# Patient Record
Sex: FEMALE | Race: BLACK OR AFRICAN AMERICAN | NOT HISPANIC OR LATINO | Employment: FULL TIME | URBAN - METROPOLITAN AREA
[De-identification: names, ages, dates, MRNs, and addresses within clinical notes are randomized per-mention and may not be internally consistent; named-entity substitution may affect disease eponyms.]

---

## 2019-09-22 PROBLEM — J20.9 ACUTE BRONCHITIS: Status: ACTIVE | Noted: 2019-09-22

## 2019-09-22 PROBLEM — J01.90 ACUTE BACTERIAL SINUSITIS: Status: ACTIVE | Noted: 2019-09-22

## 2019-09-22 PROBLEM — B96.89 ACUTE BACTERIAL SINUSITIS: Status: ACTIVE | Noted: 2019-09-22

## 2019-12-23 PROBLEM — J01.90 ACUTE BACTERIAL SINUSITIS: Status: RESOLVED | Noted: 2019-09-22 | Resolved: 2019-12-23

## 2019-12-23 PROBLEM — B96.89 ACUTE BACTERIAL SINUSITIS: Status: RESOLVED | Noted: 2019-09-22 | Resolved: 2019-12-23

## 2020-05-20 PROBLEM — S93.401A SPRAIN OF RIGHT ANKLE: Status: ACTIVE | Noted: 2020-05-20
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
## 2020-11-01 PROBLEM — S80.812A ABRASION OF ANTERIOR LEFT LOWER LEG: Status: ACTIVE | Noted: 2020-11-01

## 2020-11-01 PROBLEM — S46.912A LEFT SHOULDER STRAIN, INITIAL ENCOUNTER: Status: ACTIVE | Noted: 2020-11-01

## 2020-11-01 PROBLEM — S50.02XA CONTUSION OF LEFT ELBOW: Status: ACTIVE | Noted: 2020-11-01

## 2020-11-01 PROBLEM — S70.02XA CONTUSION OF LEFT HIP: Status: ACTIVE | Noted: 2020-11-01

## 2020-11-01 PROBLEM — S80.02XA CONTUSION OF LEFT KNEE: Status: ACTIVE | Noted: 2020-11-01

## 2020-11-28 PROBLEM — J45.909 ACUTE BRONCHITIS WITH ASTHMA: Status: ACTIVE | Noted: 2019-09-22

## 2020-11-28 PROBLEM — R07.9 CHEST PAIN: Status: ACTIVE | Noted: 2020-11-28

## 2020-11-28 PROBLEM — J02.9 ACUTE PHARYNGITIS: Status: ACTIVE | Noted: 2020-11-28

## 2020-11-28 PROBLEM — J01.00 ACUTE NON-RECURRENT MAXILLARY SINUSITIS: Status: ACTIVE | Noted: 2020-11-28

## 2020-11-28 PROBLEM — R05.9 COUGH: Status: ACTIVE | Noted: 2020-11-28

## 2021-02-07 ENCOUNTER — HOSPITAL ENCOUNTER (EMERGENCY)
Facility: HOSPITAL | Age: 41
Discharge: HOME | End: 2021-02-07
Attending: EMERGENCY MEDICINE
Payer: MEDICAID

## 2021-02-07 VITALS
DIASTOLIC BLOOD PRESSURE: 71 MMHG | HEART RATE: 81 BPM | SYSTOLIC BLOOD PRESSURE: 119 MMHG | OXYGEN SATURATION: 99 % | RESPIRATION RATE: 18 BRPM | TEMPERATURE: 98 F

## 2021-02-07 DIAGNOSIS — T14.90XA TRAUMA: ICD-10-CM

## 2021-02-07 DIAGNOSIS — S70.11XA CONTUSION OF RIGHT THIGH, INITIAL ENCOUNTER: ICD-10-CM

## 2021-02-07 DIAGNOSIS — S90.01XA CONTUSION OF RIGHT ANKLE, INITIAL ENCOUNTER: ICD-10-CM

## 2021-02-07 DIAGNOSIS — S70.01XA CONTUSION OF RIGHT HIP, INITIAL ENCOUNTER: Primary | ICD-10-CM

## 2021-02-07 PROCEDURE — 99283 EMERGENCY DEPT VISIT LOW MDM: CPT | Mod: 25

## 2021-02-07 PROCEDURE — 25000003 PHARM REV CODE 250: Performed by: EMERGENCY MEDICINE

## 2021-02-07 RX ORDER — PROMETHAZINE HYDROCHLORIDE 12.5 MG/1
12.5 TABLET ORAL EVERY 6 HOURS PRN
Qty: 10 TABLET | Refills: 0 | Status: SHIPPED | OUTPATIENT
Start: 2021-02-07

## 2021-02-07 RX ORDER — OXYCODONE AND ACETAMINOPHEN 5; 325 MG/1; MG/1
1 TABLET ORAL
Status: COMPLETED | OUTPATIENT
Start: 2021-02-07 | End: 2021-02-07

## 2021-02-07 RX ADMIN — OXYCODONE HYDROCHLORIDE AND ACETAMINOPHEN 1 TABLET: 5; 325 TABLET ORAL at 09:02

## 2021-03-01 PROBLEM — J01.00 ACUTE NON-RECURRENT MAXILLARY SINUSITIS: Status: RESOLVED | Noted: 2020-11-28 | Resolved: 2021-03-01

## 2021-05-06 ENCOUNTER — PATIENT MESSAGE (OUTPATIENT)
Dept: RESEARCH | Facility: HOSPITAL | Age: 41
End: 2021-05-06

## 2021-07-08 ENCOUNTER — HOSPITAL ENCOUNTER (EMERGENCY)
Facility: HOSPITAL | Age: 41
Discharge: HOME | End: 2021-07-08
Attending: FAMILY MEDICINE
Payer: MEDICAID

## 2021-07-08 VITALS
BODY MASS INDEX: 37.86 KG/M2 | SYSTOLIC BLOOD PRESSURE: 136 MMHG | RESPIRATION RATE: 16 BRPM | DIASTOLIC BLOOD PRESSURE: 94 MMHG | OXYGEN SATURATION: 100 % | HEART RATE: 73 BPM | TEMPERATURE: 99 F | WEIGHT: 256.38 LBS

## 2021-07-08 DIAGNOSIS — J06.9 VIRAL URI: Primary | ICD-10-CM

## 2021-07-08 LAB
GROUP A STREP, MOLECULAR: NEGATIVE
SARS-COV-2 RDRP RESP QL NAA+PROBE: NEGATIVE

## 2021-07-08 PROCEDURE — 87651 STREP A DNA AMP PROBE: CPT | Performed by: NURSE PRACTITIONER

## 2021-07-08 PROCEDURE — 96372 THER/PROPH/DIAG INJ SC/IM: CPT

## 2021-07-08 PROCEDURE — 99284 EMERGENCY DEPT VISIT MOD MDM: CPT | Mod: 25

## 2021-07-08 PROCEDURE — 63600175 PHARM REV CODE 636 W HCPCS: Performed by: NURSE PRACTITIONER

## 2021-07-08 PROCEDURE — U0002 COVID-19 LAB TEST NON-CDC: HCPCS | Performed by: NURSE PRACTITIONER

## 2021-07-08 RX ORDER — METHYLPREDNISOLONE SOD SUCC 125 MG
125 VIAL (EA) INJECTION
Status: COMPLETED | OUTPATIENT
Start: 2021-07-08 | End: 2021-07-08

## 2021-07-08 RX ORDER — FLUTICASONE PROPIONATE 50 MCG
2 SPRAY, SUSPENSION (ML) NASAL DAILY
Qty: 9.9 ML | Refills: 0 | Status: SHIPPED | OUTPATIENT
Start: 2021-07-08

## 2021-07-08 RX ORDER — PROMETHAZINE HYDROCHLORIDE AND DEXTROMETHORPHAN HYDROBROMIDE 6.25; 15 MG/5ML; MG/5ML
5 SYRUP ORAL EVERY 6 HOURS PRN
Qty: 120 ML | Refills: 0 | Status: SHIPPED | OUTPATIENT
Start: 2021-07-08 | End: 2021-07-18

## 2021-07-08 RX ADMIN — METHYLPREDNISOLONE SODIUM SUCCINATE 125 MG: 125 INJECTION, POWDER, FOR SOLUTION INTRAMUSCULAR; INTRAVENOUS at 07:07

## 2022-04-22 ENCOUNTER — HOSPITAL ENCOUNTER (EMERGENCY)
Facility: HOSPITAL | Age: 42
Discharge: HOME | End: 2022-04-23
Attending: STUDENT IN AN ORGANIZED HEALTH CARE EDUCATION/TRAINING PROGRAM
Payer: MEDICAID

## 2022-04-22 DIAGNOSIS — K08.89 PAIN, DENTAL: Primary | ICD-10-CM

## 2022-04-22 PROCEDURE — 99283 EMERGENCY DEPT VISIT LOW MDM: CPT | Mod: 25

## 2022-04-23 VITALS
OXYGEN SATURATION: 97 % | WEIGHT: 224.88 LBS | RESPIRATION RATE: 18 BRPM | HEART RATE: 72 BPM | BODY MASS INDEX: 33.21 KG/M2 | SYSTOLIC BLOOD PRESSURE: 120 MMHG | TEMPERATURE: 96 F | DIASTOLIC BLOOD PRESSURE: 80 MMHG

## 2022-04-23 PROCEDURE — 25000003 PHARM REV CODE 250: Performed by: STUDENT IN AN ORGANIZED HEALTH CARE EDUCATION/TRAINING PROGRAM

## 2022-04-23 RX ORDER — AMOXICILLIN AND CLAVULANATE POTASSIUM 875; 125 MG/1; MG/1
1 TABLET, FILM COATED ORAL 2 TIMES DAILY
Qty: 14 TABLET | Refills: 0 | OUTPATIENT
Start: 2022-04-23 | End: 2023-05-25

## 2022-04-23 RX ORDER — AMOXICILLIN AND CLAVULANATE POTASSIUM 875; 125 MG/1; MG/1
1 TABLET, FILM COATED ORAL
Status: COMPLETED | OUTPATIENT
Start: 2022-04-23 | End: 2022-04-23

## 2022-04-23 RX ORDER — OXYCODONE AND ACETAMINOPHEN 5; 325 MG/1; MG/1
1 TABLET ORAL
Status: COMPLETED | OUTPATIENT
Start: 2022-04-23 | End: 2022-04-23

## 2022-04-23 RX ADMIN — OXYCODONE HYDROCHLORIDE AND ACETAMINOPHEN 1 TABLET: 5; 325 TABLET ORAL at 12:04

## 2022-04-23 RX ADMIN — AMOXICILLIN AND CLAVULANATE POTASSIUM 1 TABLET: 875; 125 TABLET, FILM COATED ORAL at 12:04

## 2022-04-23 NOTE — DISCHARGE INSTRUCTIONS
Please follow up with your primary care physician within 2 days. Ensure that you review all lab work results and/or imaging results. If you have any questions about your discharge paperwork please call the Emergency Department.     Return to the Emergency Department if any fever, increasing pain, redness, or discharge appear.     Thank you for visiting [facility], Department of Emergency Medicine. Please see the entirety of the educational materials provided. Please note that a visit to the emergency department does not substitute ongoing care from a primary medical provider or specialist. Please ensure to follow up as recommended. However, please return to the emergency department immediately if symptoms do not improve as discussed, symptoms worsen, new symptoms develop, difficulty in following up or for any of your concerns or issues. Please note on discharge you are acknowledging understanding and agreement on medical evaluation, management recommendations and follow up recommendations.

## 2022-04-23 NOTE — ED PROVIDER NOTES
Encounter Date: 4/22/2022       History     Chief Complaint   Patient presents with    Dental Pain     PT REPORTS LEFT SIDE JAW AND DENTAL PAIN      41-year-old female with no medical history presenting with dental pain.  Patient reports the pain has been present for a week, but worsened significantly tonight to the point where she was unable to sleep.  Denies fever, chills, nausea, vomiting.  No other complaints.  No trouble swallowing or chewing.        Review of patient's allergies indicates:  No Known Allergies  No past medical history on file.  Past Surgical History:   Procedure Laterality Date    BREAST BIOPSY Right 03/16/2020    fibroadenoma     Family History   Problem Relation Age of Onset    Cancer Father      Social History     Tobacco Use    Smoking status: Never Smoker    Smokeless tobacco: Never Used   Substance Use Topics    Alcohol use: No    Drug use: No     Review of Systems   Constitutional: Negative for fever.   HENT: Positive for dental problem. Negative for sore throat.    Respiratory: Negative for shortness of breath.    Cardiovascular: Negative for chest pain.   Gastrointestinal: Negative for nausea.   Genitourinary: Negative for dysuria.   Musculoskeletal: Negative for back pain.   Skin: Negative for rash.   Neurological: Negative for weakness.   Hematological: Does not bruise/bleed easily.       Physical Exam     Initial Vitals [04/22/22 2336]   BP Pulse Resp Temp SpO2   120/80 72 18 96.4 °F (35.8 °C) 97 %      MAP       --         Physical Exam    Nursing note and vitals reviewed.  Constitutional: She appears well-developed.   HENT:   Head: Normocephalic.   Patient has generally poor dentition.  Mild tenderness to gums under teeth 21 through 23. No fluctuance or discharge.  No induration or erythema.  No mastoid tenderness.  No swelling around mastoid.   Eyes: Pupils are equal, round, and reactive to light.   Neck:   Normal range of motion.  Cardiovascular:   No murmur  heard.  Pulmonary/Chest: No respiratory distress.   Abdominal: Abdomen is soft.   Musculoskeletal:         General: No edema.      Cervical back: Normal range of motion.     Neurological: She is alert.   Skin: Skin is warm.   Psychiatric: She has a normal mood and affect.         ED Course   Procedures  Labs Reviewed - No data to display       Imaging Results    None          Medications   oxyCODONE-acetaminophen 5-325 mg per tablet 1 tablet (1 tablet Oral Given 4/23/22 0013)   amoxicillin-clavulanate 875-125mg per tablet 1 tablet (1 tablet Oral Given 4/23/22 0013)     Medical Decision Making:   Differential Diagnosis:   DDX:  Dental pain.  Possible early dental abscess, will treat pain and possible infection.  Recommend close dental follow-up  DX:  None  TX:  Analgesia PRN, antibiotics p.o.  Dispo:  Discharge with dental follow-up.                        Clinical Impression:   Final diagnoses:  [K08.89] Pain, dental (Primary)          ED Disposition Condition    Discharge Stable        ED Prescriptions     Medication Sig Dispense Start Date End Date Auth. Provider    amoxicillin-clavulanate 875-125mg (AUGMENTIN) 875-125 mg per tablet Take 1 tablet by mouth 2 (two) times daily. 14 tablet 4/23/2022  Keshawn Bailey MD        Follow-up Information     Follow up With Specialties Details Why Contact Info    Vika Smiley MD Internal Medicine Schedule an appointment as soon as possible for a visit in 2 days  [address]  [place] [address]  [phone]      [facility] - Emergency Dept Emergency Medicine   [address] [facility] [address]  [phone]    [facility] Schedule an appointment as soon as possible for a visit in 2 days  [address] [address]  [phone]             Keshawn Bailey MD  04/23/22 0014

## 2022-09-15 ENCOUNTER — HOSPITAL ENCOUNTER (EMERGENCY)
Facility: HOSPITAL | Age: 42
Discharge: HOME | End: 2022-09-15
Attending: STUDENT IN AN ORGANIZED HEALTH CARE EDUCATION/TRAINING PROGRAM
Payer: COMMERCIAL

## 2022-09-15 VITALS
WEIGHT: 229 LBS | TEMPERATURE: 97 F | BODY MASS INDEX: 33.82 KG/M2 | OXYGEN SATURATION: 99 % | HEART RATE: 66 BPM | RESPIRATION RATE: 18 BRPM | DIASTOLIC BLOOD PRESSURE: 81 MMHG | SYSTOLIC BLOOD PRESSURE: 126 MMHG

## 2022-09-15 DIAGNOSIS — M79.605 LEFT LEG PAIN: Primary | ICD-10-CM

## 2022-09-15 DIAGNOSIS — M79.606 LEG PAIN: ICD-10-CM

## 2022-09-15 LAB — D DIMER PPP IA.FEU-MCNC: 0.67 MG/L FEU

## 2022-09-15 PROCEDURE — 63600175 PHARM REV CODE 636 W HCPCS: Performed by: STUDENT IN AN ORGANIZED HEALTH CARE EDUCATION/TRAINING PROGRAM

## 2022-09-15 PROCEDURE — 96372 THER/PROPH/DIAG INJ SC/IM: CPT | Performed by: STUDENT IN AN ORGANIZED HEALTH CARE EDUCATION/TRAINING PROGRAM

## 2022-09-15 PROCEDURE — 99285 EMERGENCY DEPT VISIT HI MDM: CPT | Mod: 25

## 2022-09-15 PROCEDURE — 85379 FIBRIN DEGRADATION QUANT: CPT | Performed by: STUDENT IN AN ORGANIZED HEALTH CARE EDUCATION/TRAINING PROGRAM

## 2022-09-15 PROCEDURE — 36415 COLL VENOUS BLD VENIPUNCTURE: CPT | Performed by: STUDENT IN AN ORGANIZED HEALTH CARE EDUCATION/TRAINING PROGRAM

## 2022-09-15 RX ORDER — KETOROLAC TROMETHAMINE 30 MG/ML
15 INJECTION, SOLUTION INTRAMUSCULAR; INTRAVENOUS
Status: COMPLETED | OUTPATIENT
Start: 2022-09-15 | End: 2022-09-15

## 2022-09-15 RX ADMIN — KETOROLAC TROMETHAMINE 15 MG: 30 INJECTION, SOLUTION INTRAMUSCULAR; INTRAVENOUS at 08:09

## 2022-09-16 NOTE — DISCHARGE INSTRUCTIONS
Please follow up with your primary care physician within 2 days. Ensure that you review all lab work results and/or imaging results. If you have any questions about your discharge paperwork please call the Emergency Department.     Return to the ED for any numbness, tingling, weakness in the extremity, color changes, increasing pain, uncontrolled pain, (all signs of possible compartment syndrome) or any new or worsening symptoms.       Thank you for visiting [facility], Department of Emergency Medicine. Please see the entirety of the educational materials provided. Please note that a visit to the emergency department does not substitute ongoing care from a primary medical provider or specialist. Please ensure to follow up as recommended. However, please return to the emergency department immediately if symptoms do not improve as discussed, symptoms worsen, new symptoms develop, difficulty in following up or for any of your concerns or issues. Please note on discharge you are acknowledging understanding and agreement on medical evaluation, management recommendations and follow up recommendations.

## 2022-09-16 NOTE — ED PROVIDER NOTES
Encounter Date: 9/15/2022       History     Chief Complaint   Patient presents with    Leg Pain     Patient to ER with leg pain that started a week ago she reports that she is concerned of a blood clot     42-year-old female with no medical history presenting with left leg pain that began one week ago.  Patient reports the pain begins behind her left knee, and radiates up the lateral side of her left leg.  Denies any swelling.  No fever, chest pain, shortness of breath.  No previous DVT/PE.  Patient is on a Depo shot.  Patient is concerned she may have a blood clot.  Patient works in the medical field, and is regularly moving heavy patients.    Review of patient's allergies indicates:  No Known Allergies  History reviewed. No pertinent past medical history.  Past Surgical History:   Procedure Laterality Date    BREAST BIOPSY Right 03/16/2020    fibroadenoma     Family History   Problem Relation Age of Onset    Cancer Father      Social History     Tobacco Use    Smoking status: Never    Smokeless tobacco: Never   Substance Use Topics    Alcohol use: No    Drug use: No     Review of Systems   Constitutional:  Negative for fever.   HENT:  Negative for sore throat.    Respiratory:  Negative for shortness of breath.    Cardiovascular:  Negative for chest pain.   Gastrointestinal:  Negative for nausea.   Genitourinary:  Negative for dysuria.   Musculoskeletal:  Negative for back pain.        Left leg pain   Skin:  Negative for rash.   Neurological:  Negative for weakness.   Hematological:  Does not bruise/bleed easily.     Physical Exam     Initial Vitals   BP Pulse Resp Temp SpO2   09/15/22 2007 09/15/22 2007 09/15/22 2007 09/15/22 2006 09/15/22 2007   126/81 66 18 97.1 °F (36.2 °C) 99 %      MAP       --                Physical Exam    Nursing note and vitals reviewed.  Constitutional: She appears well-developed.   HENT:   Head: Normocephalic.   Eyes: Pupils are equal, round, and reactive to light.   Neck:   Normal  range of motion.  Cardiovascular:            No murmur heard.  Pulmonary/Chest: No respiratory distress.   Abdominal: Abdomen is soft.   Musculoskeletal:         General: No edema.      Cervical back: Normal range of motion.      Comments: Visual inspection of LL extremity displays no gross deformity, lacerations, effusions, ecchymosis or overlying skin lesions. FROM in all extremity joints in passive and active ROM displaying 5/5 MS. No palpable kelsie TTP or masses. Compartments soft. No palpable thrill on extremity. Sensation, pulses and cap refill are normal for extremity.        Neurological: She is alert.   Skin: Skin is warm.   Psychiatric: She has a normal mood and affect.       ED Course   Procedures  Labs Reviewed   D DIMER, QUANTITATIVE          Imaging Results    None          Medications   ketorolac injection 15 mg (has no administration in time range)     Medical Decision Making:   Differential Diagnosis:   DDX:  Patient presenting with left leg pain.  Given physical exam, likely muscular.  Will rule out DVT with D-dimer.  Low suspicion for fracture given no reports of acute trauma.  DX:  D-dimer  TX:  Analgesia  Dispo:  Discharge.                            Clinical Impression:   Final diagnoses:  [M79.605] Left leg pain (Primary)             Keshawn Bailey MD  09/15/22 2009

## 2022-09-16 NOTE — ED NOTES
Dr Bailey was in triage he went over discharge instructions with patient, Patient verbally understood, AVS was handed to patient

## 2023-02-11 ENCOUNTER — HOSPITAL ENCOUNTER (EMERGENCY)
Facility: HOSPITAL | Age: 43
Discharge: HOME | End: 2023-02-11
Attending: EMERGENCY MEDICINE
Payer: COMMERCIAL

## 2023-02-11 VITALS
WEIGHT: 240.19 LBS | RESPIRATION RATE: 18 BRPM | DIASTOLIC BLOOD PRESSURE: 90 MMHG | BODY MASS INDEX: 35.47 KG/M2 | SYSTOLIC BLOOD PRESSURE: 140 MMHG | OXYGEN SATURATION: 99 % | HEART RATE: 77 BPM | TEMPERATURE: 97 F
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
## 2023-02-11 DIAGNOSIS — S16.1XXA STRAIN OF NECK MUSCLE, INITIAL ENCOUNTER: ICD-10-CM

## 2023-02-11 DIAGNOSIS — M54.2 NECK PAIN: ICD-10-CM

## 2023-02-11 PROCEDURE — 99284 EMERGENCY DEPT VISIT MOD MDM: CPT

## 2023-02-11 PROCEDURE — 25000003 PHARM REV CODE 250

## 2023-02-11 RX ORDER — ERYTHROMYCIN 5 MG/G
OINTMENT OPHTHALMIC
Status: COMPLETED | OUTPATIENT
Start: 2023-02-11 | End: 2023-02-11

## 2023-02-11 RX ORDER — CELECOXIB 200 MG/1
200 CAPSULE ORAL 2 TIMES DAILY
Qty: 10 CAPSULE | Refills: 0 | Status: SHIPPED | OUTPATIENT
Start: 2023-02-11 | End: 2023-02-16

## 2023-02-11 RX ORDER — ERYTHROMYCIN 5 MG/G
OINTMENT OPHTHALMIC 3 TIMES DAILY
Qty: 3.5 G | Refills: 0 | Status: SHIPPED | OUTPATIENT
Start: 2023-02-11 | End: 2023-02-16

## 2023-02-11 RX ORDER — CYCLOBENZAPRINE HCL 10 MG
10 TABLET ORAL
Status: COMPLETED | OUTPATIENT
Start: 2023-02-11 | End: 2023-02-11

## 2023-02-11 RX ORDER — HYDROCODONE BITARTRATE AND ACETAMINOPHEN 5; 325 MG/1; MG/1
1 TABLET ORAL
Status: COMPLETED | OUTPATIENT
Start: 2023-02-11 | End: 2023-02-11

## 2023-02-11 RX ORDER — TIZANIDINE 4 MG/1
4 TABLET ORAL 3 TIMES DAILY PRN
Qty: 21 TABLET | Refills: 0 | Status: SHIPPED | OUTPATIENT
Start: 2023-02-11 | End: 2023-02-18

## 2023-02-11 RX ADMIN — HYDROCODONE BITARTRATE AND ACETAMINOPHEN 1 TABLET: 5; 325 TABLET ORAL at 07:02

## 2023-02-11 RX ADMIN — ERYTHROMYCIN 1 INCH: 5 OINTMENT OPHTHALMIC at 07:02

## 2023-02-11 RX ADMIN — CYCLOBENZAPRINE HYDROCHLORIDE 10 MG: 10 TABLET, FILM COATED ORAL at 07:02
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
## 2023-07-11 PROBLEM — Z86.16 HISTORY OF 2019 NOVEL CORONAVIRUS DISEASE (COVID-19): Status: ACTIVE | Noted: 2023-07-11

## 2023-07-11 PROBLEM — J45.901 REACTIVE AIRWAY DISEASE WITH ACUTE EXACERBATION: Status: ACTIVE | Noted: 2023-07-11

## 2023-07-11 PROBLEM — J40 BRONCHITIS: Status: ACTIVE | Noted: 2023-07-11

## 2023-07-11 PROBLEM — R09.82 POST-NASAL DRIP: Status: ACTIVE | Noted: 2023-07-11

## 2023-07-11 PROBLEM — Z91.09 ENVIRONMENTAL ALLERGIES: Status: ACTIVE | Noted: 2023-07-11
